# Patient Record
Sex: FEMALE | Race: WHITE | NOT HISPANIC OR LATINO
[De-identification: names, ages, dates, MRNs, and addresses within clinical notes are randomized per-mention and may not be internally consistent; named-entity substitution may affect disease eponyms.]

---

## 2023-06-12 PROBLEM — Z00.00 ENCOUNTER FOR PREVENTIVE HEALTH EXAMINATION: Status: ACTIVE | Noted: 2023-06-12

## 2023-06-15 ENCOUNTER — APPOINTMENT (OUTPATIENT)
Dept: ORTHOPEDIC SURGERY | Facility: CLINIC | Age: 58
End: 2023-06-15
Payer: COMMERCIAL

## 2023-06-15 VITALS — WEIGHT: 230 LBS | BODY MASS INDEX: 36.96 KG/M2 | HEIGHT: 66 IN

## 2023-06-15 DIAGNOSIS — S43.61XA SPRAIN OF RIGHT STERNOCLAVICULAR JOINT, INITIAL ENCOUNTER: ICD-10-CM

## 2023-06-15 DIAGNOSIS — Z86.39 PERSONAL HISTORY OF OTHER ENDOCRINE, NUTRITIONAL AND METABOLIC DISEASE: ICD-10-CM

## 2023-06-15 DIAGNOSIS — Z87.891 PERSONAL HISTORY OF NICOTINE DEPENDENCE: ICD-10-CM

## 2023-06-15 PROCEDURE — 73000 X-RAY EXAM OF COLLAR BONE: CPT | Mod: RT

## 2023-06-15 PROCEDURE — 99213 OFFICE O/P EST LOW 20 MIN: CPT

## 2023-06-15 RX ORDER — LEVOTHYROXINE SODIUM 137 UG/1
TABLET ORAL
Refills: 0 | Status: ACTIVE | COMMUNITY

## 2023-06-15 RX ORDER — SUBCUTANEOUS INSULIN PUMP
EACH MISCELLANEOUS
Refills: 0 | Status: ACTIVE | COMMUNITY

## 2023-06-15 RX ORDER — METHYLPREDNISOLONE 4 MG/1
4 TABLET ORAL
Qty: 1 | Refills: 0 | Status: ACTIVE | COMMUNITY
Start: 2023-06-15 | End: 1900-01-01

## 2023-06-15 NOTE — ASSESSMENT
[FreeTextEntry1] : R SC joint pain/sprain.\par Activity modification.\par Ice.\par Trial of PT.\par MDP, advised to monitor sugar.\par RTO 6-8 weeks.\par

## 2023-06-15 NOTE — IMAGING
[Right] : right shoulder [There are no fractures, subluxations or dislocations. No significant abnormalities are seen] : There are no fractures, subluxations or dislocations. No significant abnormalities are seen [Calcific density] : Calcific density

## 2023-06-15 NOTE — PHYSICAL EXAM
[Right] : right shoulder [5 ___] : forward flexion 5[unfilled]/5 [5___] : internal rotation 5[unfilled]/5 [] : no tenderness at lateral shoulder [FreeTextEntry9] : \par ER 50

## 2023-06-15 NOTE — HISTORY OF PRESENT ILLNESS
[7] : 7 [5] : 5 [Sharp] : sharp [Full time] : Work status: full time [de-identified] : 6/15/23: 58 yo RHD female with right shoulder pain since May 2023. She reports pain in her clavicle. Denies specific injury but did yard work and carrier heavy stroller. There is pain with lifting and she noticed swelling near her chest. She tried ice and heat and tylenol. She went to chiropractor. She tried ice/heat. She is allergic to aspirin.\par \par PMHx: DM [FreeTextEntry1] : R shoulder/clavicle [de-identified] : tylenol

## 2023-08-17 ENCOUNTER — APPOINTMENT (OUTPATIENT)
Dept: ORTHOPEDIC SURGERY | Facility: CLINIC | Age: 58
End: 2023-08-17

## 2024-08-19 ENCOUNTER — APPOINTMENT (OUTPATIENT)
Dept: ORTHOPEDIC SURGERY | Facility: CLINIC | Age: 59
End: 2024-08-19

## 2024-08-22 ENCOUNTER — APPOINTMENT (OUTPATIENT)
Dept: ORTHOPEDIC SURGERY | Facility: CLINIC | Age: 59
End: 2024-08-22
Payer: COMMERCIAL

## 2024-08-22 VITALS — BODY MASS INDEX: 36.96 KG/M2 | WEIGHT: 230 LBS | HEIGHT: 66 IN

## 2024-08-22 DIAGNOSIS — M23.91 UNSPECIFIED INTERNAL DERANGEMENT OF RIGHT KNEE: ICD-10-CM

## 2024-08-22 DIAGNOSIS — M17.11 UNILATERAL PRIMARY OSTEOARTHRITIS, RIGHT KNEE: ICD-10-CM

## 2024-08-22 DIAGNOSIS — Z86.39 PERSONAL HISTORY OF OTHER ENDOCRINE, NUTRITIONAL AND METABOLIC DISEASE: ICD-10-CM

## 2024-08-22 DIAGNOSIS — E66.9 OBESITY, UNSPECIFIED: ICD-10-CM

## 2024-08-22 DIAGNOSIS — E78.00 PURE HYPERCHOLESTEROLEMIA, UNSPECIFIED: ICD-10-CM

## 2024-08-22 PROCEDURE — 99214 OFFICE O/P EST MOD 30 MIN: CPT

## 2024-08-22 RX ORDER — INSULIN LISPRO 100 [IU]/ML
100 INJECTION, SOLUTION INTRAVENOUS; SUBCUTANEOUS
Qty: 10 | Refills: 0 | Status: ACTIVE | COMMUNITY
Start: 2024-01-15

## 2024-08-22 RX ORDER — MELOXICAM 15 MG/1
15 TABLET ORAL DAILY
Qty: 30 | Refills: 1 | Status: ACTIVE | COMMUNITY
Start: 2024-08-22 | End: 2024-10-21

## 2024-08-22 RX ORDER — ERGOCALCIFEROL 1.25 MG/1
1.25 MG CAPSULE, LIQUID FILLED ORAL
Qty: 12 | Refills: 0 | Status: ACTIVE | COMMUNITY
Start: 2024-03-25

## 2024-08-22 RX ORDER — MELOXICAM 15 MG/1
15 TABLET ORAL
Qty: 30 | Refills: 1 | Status: ACTIVE | COMMUNITY
Start: 2024-08-22 | End: 1900-01-01

## 2024-08-22 RX ORDER — ATORVASTATIN CALCIUM 10 MG/1
10 TABLET, FILM COATED ORAL
Qty: 90 | Refills: 0 | Status: ACTIVE | COMMUNITY
Start: 2024-01-23

## 2024-08-22 NOTE — IMAGING
[de-identified] : Right Knee Exam:  Inspection: No effusion, no warmth, no ecchymosis, no erythema. Palpation: medial joint line tenderness to palpation, no palpable defects, negative Fernanda. Range of motion: 0-130 without pain Strength: 5/5 quadriceps and hamstring strength Special testing: Negative Lachman, equivocal Anibal, negative anterior drawer, negative posterior drawer, negative patella apprehension; no extensor lag: no varus or valgus instability. Motor and sensory intact distally Gait: Non-antalgic gait [Right] : right knee [All Views] : anteroposterior, lateral, skyline, and anteroposterior standing [Mild patellofemoral OA] : Mild patellofemoral OA normal...

## 2024-08-22 NOTE — IMAGING
[de-identified] : Right Knee Exam:  Inspection: No effusion, no warmth, no ecchymosis, no erythema. Palpation: medial joint line tenderness to palpation, no palpable defects, negative Fernanda. Range of motion: 0-130 without pain Strength: 5/5 quadriceps and hamstring strength Special testing: Negative Lachman, equivocal Anibal, negative anterior drawer, negative posterior drawer, negative patella apprehension; no extensor lag: no varus or valgus instability. Motor and sensory intact distally Gait: Non-antalgic gait [Right] : right knee [All Views] : anteroposterior, lateral, skyline, and anteroposterior standing [Mild patellofemoral OA] : Mild patellofemoral OA

## 2024-08-22 NOTE — ASSESSMENT
[FreeTextEntry1] : 60yo female with atraumatic right knee pain since june  X-rays reviewed - mild patellofemoral OA  Due to exquisite tenderness of medial joint line on exam and experiencing mechanical symptoms, will eval with MRI for MMT  Prognosis uncertain at this time  Meloxicam sent to pharmacy - r/b/a discussed - instructed how to take medication  Discussed possible need for arthroscopy  All questions answered RTC after MRI to review results    The patient's current medication management of their orthopedic diagnosis was reviewed today: (1) We discussed a comprehensive treatment plan that included pharmaceutical management involving the use of prescription medications. (2) There is a moderate risk of morbidity with further treatment, especially from use of prescription strength medications and possible side effects of these medications which include upset stomach with oral medications, skin reactions to topical medications and cardiac/renal/diabetes issues with long term use. (3) I recommended that the patient follow-up with their medical physician to discuss any significant specific potential issues with long term medication use such as interactions with current medications or with exacerbation of underlying medical comorbidities. (4) The benefits and risks associated with use of injectable, oral or topical, prescription and over the counter anti-inflammatory medications were discussed with the patient. The patient voiced understanding of the risks including but not limited to bleeding, stroke, kidney dysfunction, heart disease, and were referred to the black box warning label for further information.  I am working today under the supervision of Dr. Mendes and I am following the plan of care of Dr. Mendes as described by him on this date. Progress note completed by China Griffith PA-C under the supervision of Dr. Mendes.

## 2024-08-22 NOTE — HISTORY OF PRESENT ILLNESS
[de-identified] : 8/22/24: Patient is here for right knee pain that began in 6/2024, not due to injury. Pain is posterior, lateral. No swelling. Denies clicking/buckling/locking. Worsens with lifting leg/bending. No prior injury. No formal treatment to date.

## 2024-08-22 NOTE — HISTORY OF PRESENT ILLNESS
[de-identified] : 8/22/24: Patient is here for right knee pain that began in 6/2024, not due to injury. Pain is posterior, lateral. No swelling. Denies clicking/buckling/locking. Worsens with lifting leg/bending. No prior injury. No formal treatment to date.